# Patient Record
Sex: FEMALE | Race: WHITE | NOT HISPANIC OR LATINO | Employment: UNEMPLOYED | URBAN - METROPOLITAN AREA
[De-identification: names, ages, dates, MRNs, and addresses within clinical notes are randomized per-mention and may not be internally consistent; named-entity substitution may affect disease eponyms.]

---

## 2019-10-30 PROBLEM — G56.01 CARPAL TUNNEL SYNDROME, RIGHT UPPER LIMB: Status: ACTIVE | Noted: 2019-10-30

## 2020-01-08 PROBLEM — G56.02 CARPAL TUNNEL SYNDROME, LEFT UPPER LIMB: Status: ACTIVE | Noted: 2020-01-08

## 2021-02-28 ENCOUNTER — HOSPITAL ENCOUNTER (OUTPATIENT)
Facility: MEDICAL CENTER | Age: 41
End: 2021-03-01
Attending: EMERGENCY MEDICINE | Admitting: INTERNAL MEDICINE
Payer: MEDICAID

## 2021-02-28 ENCOUNTER — APPOINTMENT (OUTPATIENT)
Dept: RADIOLOGY | Facility: MEDICAL CENTER | Age: 41
End: 2021-02-28
Attending: EMERGENCY MEDICINE
Payer: MEDICAID

## 2021-02-28 ENCOUNTER — HOSPITAL ENCOUNTER (OUTPATIENT)
Dept: RADIOLOGY | Facility: MEDICAL CENTER | Age: 41
End: 2021-02-28
Payer: MEDICAID

## 2021-02-28 DIAGNOSIS — R10.30 LOWER ABDOMINAL PAIN: ICD-10-CM

## 2021-02-28 DIAGNOSIS — N73.9 PID (PELVIC INFLAMMATORY DISEASE): ICD-10-CM

## 2021-02-28 PROBLEM — J45.20 INTERMITTENT ASTHMA: Status: ACTIVE | Noted: 2021-02-28

## 2021-02-28 PROBLEM — I10 HYPERTENSION: Status: ACTIVE | Noted: 2021-02-28

## 2021-02-28 LAB
ALBUMIN SERPL BCP-MCNC: 3.3 G/DL (ref 3.2–4.9)
ALBUMIN/GLOB SERPL: 1.1 G/DL
ALP SERPL-CCNC: 74 U/L (ref 30–99)
ALT SERPL-CCNC: 76 U/L (ref 2–50)
AMORPH CRY #/AREA URNS HPF: PRESENT /HPF
ANION GAP SERPL CALC-SCNC: 8 MMOL/L (ref 7–16)
APPEARANCE UR: ABNORMAL
AST SERPL-CCNC: 37 U/L (ref 12–45)
BACTERIA #/AREA URNS HPF: NEGATIVE /HPF
BASOPHILS # BLD AUTO: 0.3 % (ref 0–1.8)
BASOPHILS # BLD: 0.02 K/UL (ref 0–0.12)
BILIRUB SERPL-MCNC: 0.4 MG/DL (ref 0.1–1.5)
BILIRUB UR QL STRIP.AUTO: NEGATIVE
BUN SERPL-MCNC: 10 MG/DL (ref 8–22)
C TRACH DNA SPEC QL NAA+PROBE: NEGATIVE
CALCIUM SERPL-MCNC: 8.4 MG/DL (ref 8.5–10.5)
CANDIDA DNA VAG QL PROBE+SIG AMP: NEGATIVE
CHLORIDE SERPL-SCNC: 104 MMOL/L (ref 96–112)
CO2 SERPL-SCNC: 21 MMOL/L (ref 20–33)
COLOR UR: YELLOW
CREAT SERPL-MCNC: 0.6 MG/DL (ref 0.5–1.4)
EOSINOPHIL # BLD AUTO: 0.13 K/UL (ref 0–0.51)
EOSINOPHIL NFR BLD: 1.6 % (ref 0–6.9)
EPI CELLS #/AREA URNS HPF: ABNORMAL /HPF
ERYTHROCYTE [DISTWIDTH] IN BLOOD BY AUTOMATED COUNT: 44.5 FL (ref 35.9–50)
G VAGINALIS DNA VAG QL PROBE+SIG AMP: POSITIVE
GLOBULIN SER CALC-MCNC: 2.9 G/DL (ref 1.9–3.5)
GLUCOSE SERPL-MCNC: 89 MG/DL (ref 65–99)
GLUCOSE UR STRIP.AUTO-MCNC: NEGATIVE MG/DL
HCT VFR BLD AUTO: 38.1 % (ref 37–47)
HGB BLD-MCNC: 12.5 G/DL (ref 12–16)
IMM GRANULOCYTES # BLD AUTO: 0.04 K/UL (ref 0–0.11)
IMM GRANULOCYTES NFR BLD AUTO: 0.5 % (ref 0–0.9)
KETONES UR STRIP.AUTO-MCNC: NEGATIVE MG/DL
LEUKOCYTE ESTERASE UR QL STRIP.AUTO: ABNORMAL
LYMPHOCYTES # BLD AUTO: 2.15 K/UL (ref 1–4.8)
LYMPHOCYTES NFR BLD: 27.1 % (ref 22–41)
MCH RBC QN AUTO: 29.6 PG (ref 27–33)
MCHC RBC AUTO-ENTMCNC: 32.8 G/DL (ref 33.6–35)
MCV RBC AUTO: 90.1 FL (ref 81.4–97.8)
MICRO URNS: ABNORMAL
MONOCYTES # BLD AUTO: 0.73 K/UL (ref 0–0.85)
MONOCYTES NFR BLD AUTO: 9.2 % (ref 0–13.4)
N GONORRHOEA DNA SPEC QL NAA+PROBE: NEGATIVE
NEUTROPHILS # BLD AUTO: 4.87 K/UL (ref 2–7.15)
NEUTROPHILS NFR BLD: 61.3 % (ref 44–72)
NITRITE UR QL STRIP.AUTO: NEGATIVE
NRBC # BLD AUTO: 0 K/UL
NRBC BLD-RTO: 0 /100 WBC
PH UR STRIP.AUTO: 5 [PH] (ref 5–8)
PLATELET # BLD AUTO: 188 K/UL (ref 164–446)
PMV BLD AUTO: 10 FL (ref 9–12.9)
POTASSIUM SERPL-SCNC: 3.9 MMOL/L (ref 3.6–5.5)
PROT SERPL-MCNC: 6.2 G/DL (ref 6–8.2)
PROT UR QL STRIP: NEGATIVE MG/DL
RBC # BLD AUTO: 4.23 M/UL (ref 4.2–5.4)
RBC # URNS HPF: ABNORMAL /HPF
RBC UR QL AUTO: ABNORMAL
SARS-COV-2 RNA RESP QL NAA+PROBE: NOTDETECTED
SODIUM SERPL-SCNC: 133 MMOL/L (ref 135–145)
SP GR UR STRIP.AUTO: >=1.045
SPECIMEN SOURCE: NORMAL
SPECIMEN SOURCE: NORMAL
T VAGINALIS DNA VAG QL PROBE+SIG AMP: NEGATIVE
UROBILINOGEN UR STRIP.AUTO-MCNC: 0.2 MG/DL
WBC # BLD AUTO: 7.9 K/UL (ref 4.8–10.8)
WBC #/AREA URNS HPF: ABNORMAL /HPF

## 2021-02-28 PROCEDURE — G0378 HOSPITAL OBSERVATION PER HR: HCPCS

## 2021-02-28 PROCEDURE — 700111 HCHG RX REV CODE 636 W/ 250 OVERRIDE (IP): Performed by: INTERNAL MEDICINE

## 2021-02-28 PROCEDURE — 76856 US EXAM PELVIC COMPLETE: CPT

## 2021-02-28 PROCEDURE — U0005 INFEC AGEN DETEC AMPLI PROBE: HCPCS

## 2021-02-28 PROCEDURE — 87660 TRICHOMONAS VAGIN DIR PROBE: CPT

## 2021-02-28 PROCEDURE — U0003 INFECTIOUS AGENT DETECTION BY NUCLEIC ACID (DNA OR RNA); SEVERE ACUTE RESPIRATORY SYNDROME CORONAVIRUS 2 (SARS-COV-2) (CORONAVIRUS DISEASE [COVID-19]), AMPLIFIED PROBE TECHNIQUE, MAKING USE OF HIGH THROUGHPUT TECHNOLOGIES AS DESCRIBED BY CMS-2020-01-R: HCPCS

## 2021-02-28 PROCEDURE — 85025 COMPLETE CBC W/AUTO DIFF WBC: CPT

## 2021-02-28 PROCEDURE — 81001 URINALYSIS AUTO W/SCOPE: CPT

## 2021-02-28 PROCEDURE — 700105 HCHG RX REV CODE 258: Performed by: EMERGENCY MEDICINE

## 2021-02-28 PROCEDURE — 87480 CANDIDA DNA DIR PROBE: CPT

## 2021-02-28 PROCEDURE — 99285 EMERGENCY DEPT VISIT HI MDM: CPT

## 2021-02-28 PROCEDURE — 96367 TX/PROPH/DG ADDL SEQ IV INF: CPT

## 2021-02-28 PROCEDURE — 700111 HCHG RX REV CODE 636 W/ 250 OVERRIDE (IP): Performed by: EMERGENCY MEDICINE

## 2021-02-28 PROCEDURE — 99220 PR INITIAL OBSERVATION CARE,LEVL III: CPT | Performed by: INTERNAL MEDICINE

## 2021-02-28 PROCEDURE — 96374 THER/PROPH/DIAG INJ IV PUSH: CPT

## 2021-02-28 PROCEDURE — A9270 NON-COVERED ITEM OR SERVICE: HCPCS | Performed by: INTERNAL MEDICINE

## 2021-02-28 PROCEDURE — 700102 HCHG RX REV CODE 250 W/ 637 OVERRIDE(OP): Performed by: INTERNAL MEDICINE

## 2021-02-28 PROCEDURE — 87491 CHLMYD TRACH DNA AMP PROBE: CPT

## 2021-02-28 PROCEDURE — 96376 TX/PRO/DX INJ SAME DRUG ADON: CPT

## 2021-02-28 PROCEDURE — 87591 N.GONORRHOEAE DNA AMP PROB: CPT

## 2021-02-28 PROCEDURE — 700101 HCHG RX REV CODE 250: Performed by: EMERGENCY MEDICINE

## 2021-02-28 PROCEDURE — 96365 THER/PROPH/DIAG IV INF INIT: CPT

## 2021-02-28 PROCEDURE — 96372 THER/PROPH/DIAG INJ SC/IM: CPT | Mod: XU

## 2021-02-28 PROCEDURE — 87510 GARDNER VAG DNA DIR PROBE: CPT

## 2021-02-28 PROCEDURE — 96375 TX/PRO/DX INJ NEW DRUG ADDON: CPT

## 2021-02-28 PROCEDURE — 80053 COMPREHEN METABOLIC PANEL: CPT

## 2021-02-28 RX ORDER — BISACODYL 10 MG
10 SUPPOSITORY, RECTAL RECTAL
Status: DISCONTINUED | OUTPATIENT
Start: 2021-02-28 | End: 2021-03-01 | Stop reason: HOSPADM

## 2021-02-28 RX ORDER — ACETAMINOPHEN 325 MG/1
650 TABLET ORAL EVERY 6 HOURS PRN
Status: DISCONTINUED | OUTPATIENT
Start: 2021-02-28 | End: 2021-03-01 | Stop reason: HOSPADM

## 2021-02-28 RX ORDER — OXYCODONE HYDROCHLORIDE 5 MG/1
2.5 TABLET ORAL
Status: DISCONTINUED | OUTPATIENT
Start: 2021-02-28 | End: 2021-03-01 | Stop reason: HOSPADM

## 2021-02-28 RX ORDER — PROMETHAZINE HYDROCHLORIDE 25 MG/1
12.5-25 TABLET ORAL EVERY 4 HOURS PRN
Status: DISCONTINUED | OUTPATIENT
Start: 2021-02-28 | End: 2021-03-01 | Stop reason: HOSPADM

## 2021-02-28 RX ORDER — ONDANSETRON 2 MG/ML
4 INJECTION INTRAMUSCULAR; INTRAVENOUS EVERY 4 HOURS PRN
Status: DISCONTINUED | OUTPATIENT
Start: 2021-02-28 | End: 2021-03-01 | Stop reason: HOSPADM

## 2021-02-28 RX ORDER — METRONIDAZOLE 500 MG/1
500 TABLET ORAL EVERY 8 HOURS
Status: DISCONTINUED | OUTPATIENT
Start: 2021-02-28 | End: 2021-03-01 | Stop reason: HOSPADM

## 2021-02-28 RX ORDER — OXYCODONE HYDROCHLORIDE 5 MG/1
5 TABLET ORAL
Status: DISCONTINUED | OUTPATIENT
Start: 2021-02-28 | End: 2021-03-01 | Stop reason: HOSPADM

## 2021-02-28 RX ORDER — PROCHLORPERAZINE EDISYLATE 5 MG/ML
5-10 INJECTION INTRAMUSCULAR; INTRAVENOUS EVERY 4 HOURS PRN
Status: DISCONTINUED | OUTPATIENT
Start: 2021-02-28 | End: 2021-03-01 | Stop reason: HOSPADM

## 2021-02-28 RX ORDER — DOXYCYCLINE 100 MG/1
100 TABLET ORAL EVERY 12 HOURS
Status: DISCONTINUED | OUTPATIENT
Start: 2021-02-28 | End: 2021-03-01 | Stop reason: HOSPADM

## 2021-02-28 RX ORDER — MORPHINE SULFATE 4 MG/ML
2 INJECTION, SOLUTION INTRAMUSCULAR; INTRAVENOUS
Status: DISCONTINUED | OUTPATIENT
Start: 2021-02-28 | End: 2021-03-01 | Stop reason: HOSPADM

## 2021-02-28 RX ORDER — MORPHINE SULFATE 4 MG/ML
4 INJECTION, SOLUTION INTRAMUSCULAR; INTRAVENOUS ONCE
Status: COMPLETED | OUTPATIENT
Start: 2021-02-28 | End: 2021-02-28

## 2021-02-28 RX ORDER — HYDROCHLOROTHIAZIDE 25 MG/1
25 TABLET ORAL EVERY MORNING
Status: DISCONTINUED | OUTPATIENT
Start: 2021-02-28 | End: 2021-03-01 | Stop reason: HOSPADM

## 2021-02-28 RX ORDER — KETOROLAC TROMETHAMINE 30 MG/ML
30 INJECTION, SOLUTION INTRAMUSCULAR; INTRAVENOUS EVERY 6 HOURS PRN
Status: DISCONTINUED | OUTPATIENT
Start: 2021-02-28 | End: 2021-03-01 | Stop reason: HOSPADM

## 2021-02-28 RX ORDER — LABETALOL HYDROCHLORIDE 5 MG/ML
10 INJECTION, SOLUTION INTRAVENOUS EVERY 4 HOURS PRN
Status: DISCONTINUED | OUTPATIENT
Start: 2021-02-28 | End: 2021-03-01 | Stop reason: HOSPADM

## 2021-02-28 RX ORDER — LISINOPRIL 10 MG/1
10 TABLET ORAL EVERY MORNING
Status: DISCONTINUED | OUTPATIENT
Start: 2021-02-28 | End: 2021-03-01 | Stop reason: HOSPADM

## 2021-02-28 RX ORDER — AMOXICILLIN 250 MG
2 CAPSULE ORAL 2 TIMES DAILY
Status: DISCONTINUED | OUTPATIENT
Start: 2021-02-28 | End: 2021-03-01 | Stop reason: HOSPADM

## 2021-02-28 RX ORDER — PROMETHAZINE HYDROCHLORIDE 25 MG/1
12.5-25 SUPPOSITORY RECTAL EVERY 4 HOURS PRN
Status: DISCONTINUED | OUTPATIENT
Start: 2021-02-28 | End: 2021-03-01 | Stop reason: HOSPADM

## 2021-02-28 RX ORDER — ONDANSETRON 4 MG/1
4 TABLET, ORALLY DISINTEGRATING ORAL EVERY 4 HOURS PRN
Status: DISCONTINUED | OUTPATIENT
Start: 2021-02-28 | End: 2021-03-01 | Stop reason: HOSPADM

## 2021-02-28 RX ORDER — MORPHINE SULFATE 4 MG/ML
4 INJECTION, SOLUTION INTRAMUSCULAR; INTRAVENOUS ONCE
Status: DISCONTINUED | OUTPATIENT
Start: 2021-02-28 | End: 2021-02-28

## 2021-02-28 RX ORDER — POLYETHYLENE GLYCOL 3350 17 G/17G
1 POWDER, FOR SOLUTION ORAL
Status: DISCONTINUED | OUTPATIENT
Start: 2021-02-28 | End: 2021-03-01 | Stop reason: HOSPADM

## 2021-02-28 RX ORDER — SODIUM CHLORIDE, SODIUM LACTATE, POTASSIUM CHLORIDE, CALCIUM CHLORIDE 600; 310; 30; 20 MG/100ML; MG/100ML; MG/100ML; MG/100ML
INJECTION, SOLUTION INTRAVENOUS CONTINUOUS
Status: DISCONTINUED | OUTPATIENT
Start: 2021-02-28 | End: 2021-02-28

## 2021-02-28 RX ORDER — CLONIDINE HYDROCHLORIDE 0.1 MG/1
0.1 TABLET ORAL EVERY 6 HOURS PRN
Status: DISCONTINUED | OUTPATIENT
Start: 2021-02-28 | End: 2021-03-01 | Stop reason: HOSPADM

## 2021-02-28 RX ORDER — ONDANSETRON 2 MG/ML
4 INJECTION INTRAMUSCULAR; INTRAVENOUS ONCE
Status: COMPLETED | OUTPATIENT
Start: 2021-02-28 | End: 2021-02-28

## 2021-02-28 RX ORDER — ONDANSETRON 2 MG/ML
4 INJECTION INTRAMUSCULAR; INTRAVENOUS ONCE
Status: DISCONTINUED | OUTPATIENT
Start: 2021-02-28 | End: 2021-02-28

## 2021-02-28 RX ADMIN — KETOROLAC TROMETHAMINE 30 MG: 30 INJECTION, SOLUTION INTRAMUSCULAR; INTRAVENOUS at 18:38

## 2021-02-28 RX ADMIN — HYDROCHLOROTHIAZIDE 25 MG: 25 TABLET ORAL at 11:07

## 2021-02-28 RX ADMIN — DOCUSATE SODIUM 50 MG AND SENNOSIDES 8.6 MG 2 TABLET: 8.6; 5 TABLET, FILM COATED ORAL at 18:38

## 2021-02-28 RX ADMIN — OXYCODONE 5 MG: 5 TABLET ORAL at 11:07

## 2021-02-28 RX ADMIN — DOXYCYCLINE 100 MG: 100 TABLET, FILM COATED ORAL at 18:37

## 2021-02-28 RX ADMIN — MORPHINE SULFATE 4 MG: 4 INJECTION INTRAVENOUS at 08:47

## 2021-02-28 RX ADMIN — ONDANSETRON 4 MG: 2 INJECTION INTRAMUSCULAR; INTRAVENOUS at 08:47

## 2021-02-28 RX ADMIN — OXYCODONE 5 MG: 5 TABLET ORAL at 16:49

## 2021-02-28 RX ADMIN — SODIUM CHLORIDE, POTASSIUM CHLORIDE, SODIUM LACTATE AND CALCIUM CHLORIDE: 600; 310; 30; 20 INJECTION, SOLUTION INTRAVENOUS at 07:10

## 2021-02-28 RX ADMIN — LISINOPRIL 10 MG: 10 TABLET ORAL at 11:06

## 2021-02-28 RX ADMIN — ENOXAPARIN SODIUM 40 MG: 40 INJECTION SUBCUTANEOUS at 12:43

## 2021-02-28 RX ADMIN — CEFTRIAXONE SODIUM 2 G: 2 INJECTION, POWDER, FOR SOLUTION INTRAMUSCULAR; INTRAVENOUS at 11:16

## 2021-02-28 RX ADMIN — DOXYCYCLINE 100 MG: 100 INJECTION, POWDER, LYOPHILIZED, FOR SOLUTION INTRAVENOUS at 11:17

## 2021-02-28 RX ADMIN — ACETAMINOPHEN 650 MG: 325 TABLET ORAL at 19:50

## 2021-02-28 RX ADMIN — KETOROLAC TROMETHAMINE 30 MG: 30 INJECTION, SOLUTION INTRAMUSCULAR; INTRAVENOUS at 12:43

## 2021-02-28 RX ADMIN — METRONIDAZOLE 500 MG: 500 TABLET ORAL at 22:35

## 2021-02-28 RX ADMIN — METRONIDAZOLE 500 MG: 500 INJECTION, SOLUTION INTRAVENOUS at 12:42

## 2021-02-28 ASSESSMENT — LIFESTYLE VARIABLES
HAVE YOU EVER FELT YOU SHOULD CUT DOWN ON YOUR DRINKING: YES
HAVE PEOPLE ANNOYED YOU BY CRITICIZING YOUR DRINKING: NO
HOW MANY TIMES IN THE PAST YEAR HAVE YOU HAD 5 OR MORE DRINKS IN A DAY: 0
ALCOHOL_USE: YES
EVER HAD A DRINK FIRST THING IN THE MORNING TO STEADY YOUR NERVES TO GET RID OF A HANGOVER: YES
AVERAGE NUMBER OF DAYS PER WEEK YOU HAVE A DRINK CONTAINING ALCOHOL: 5
EVER FELT BAD OR GUILTY ABOUT YOUR DRINKING: YES
HAVE PEOPLE ANNOYED YOU BY CRITICIZING YOUR DRINKING: NO
CONSUMPTION TOTAL: POSITIVE
CONSUMPTION TOTAL: POSITIVE
ON A TYPICAL DAY WHEN YOU DRINK ALCOHOL HOW MANY DRINKS DO YOU HAVE: 5
TOTAL SCORE: 0
TOTAL SCORE: 0
ON A TYPICAL DAY WHEN YOU DRINK ALCOHOL HOW MANY DRINKS DO YOU HAVE: 1
TOTAL SCORE: 3
TOTAL SCORE: 3
EVER HAD A DRINK FIRST THING IN THE MORNING TO STEADY YOUR NERVES TO GET RID OF A HANGOVER: NO
TOTAL SCORE: 3
DOES PATIENT WANT TO TALK TO SOMEONE ABOUT QUITTING: NO
HOW MANY TIMES IN THE PAST YEAR HAVE YOU HAD 5 OR MORE DRINKS IN A DAY: 20
DO YOU DRINK ALCOHOL: YES
TOTAL SCORE: 0
AVERAGE NUMBER OF DAYS PER WEEK YOU HAVE A DRINK CONTAINING ALCOHOL: 7
DOES PATIENT WANT TO STOP DRINKING: YES
HAVE YOU EVER FELT YOU SHOULD CUT DOWN ON YOUR DRINKING: NO
EVER FELT BAD OR GUILTY ABOUT YOUR DRINKING: NO
DOES PATIENT WANT TO STOP DRINKING: NO

## 2021-02-28 ASSESSMENT — FIBROSIS 4 INDEX
FIB4 SCORE: 0.9
FIB4 SCORE: 0.9

## 2021-02-28 ASSESSMENT — PATIENT HEALTH QUESTIONNAIRE - PHQ9
1. LITTLE INTEREST OR PLEASURE IN DOING THINGS: NOT AT ALL
SUM OF ALL RESPONSES TO PHQ9 QUESTIONS 1 AND 2: 0
2. FEELING DOWN, DEPRESSED, IRRITABLE, OR HOPELESS: NOT AT ALL
2. FEELING DOWN, DEPRESSED, IRRITABLE, OR HOPELESS: NOT AT ALL
SUM OF ALL RESPONSES TO PHQ9 QUESTIONS 1 AND 2: 0
1. LITTLE INTEREST OR PLEASURE IN DOING THINGS: NOT AT ALL

## 2021-02-28 ASSESSMENT — ENCOUNTER SYMPTOMS
NAUSEA: 0
BLOOD IN STOOL: 0
CHILLS: 0
DIARRHEA: 0
ABDOMINAL PAIN: 1
COUGH: 0
VOMITING: 0
FEVER: 0

## 2021-02-28 ASSESSMENT — PAIN DESCRIPTION - PAIN TYPE
TYPE: ACUTE PAIN

## 2021-02-28 ASSESSMENT — PAIN SCALES - WONG BAKER: WONGBAKER_NUMERICALRESPONSE: HURTS EVEN MORE

## 2021-02-28 NOTE — ED TRIAGE NOTES
"Chief Complaint   Patient presents with   • Sent by MD     Patient bib care flight from [facility] as a transfer for possible acute appendicitis. Patient c/o of abdominal pain x 2 days. Possible appendicitis and ovarian abscess from CT. Patient treated with 200 mcg of fentanyl and 4 mg zofran in flight. 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 /86   Pulse 65   Temp 36.1 °C (97 °F) (Temporal)   Resp 14   Ht 1.499 m (4' 11\")   Wt 77.1 kg (170 lb)   LMP 02/21/2021   SpO2 98%   BMI 34.34 kg/m²     "

## 2021-02-28 NOTE — CONSULTS
Obstetrics & Gynecology Consultation    Date of Service  2/28/2021    Referring Physician  Eliot uGerra M.D.    Consulting Physician  Tony Belle D.O.    Reason for Consultation  Suspected TOA, pelvic pain    History of Presenting Illness  40 y.o. female who presented 2/28/2021 to the ER with pelvic pain for the last day.  It is worse on the left and is sharp.  Movement makes the pain worse.  She has had PID in the past.  Denies any vaginal discharge.  Denies nausea, vomiting, diarrhea.  Denies fevers.     She initially went to [facility], where CT showed left adnexa demonstrating the cystic structure likely hydrosalpinx/pyosalpinx versus less likely tubo-ovarian abscess.  Patient was then transferred to [facility] ED.  A pelvic exam was performed in the ED and she was found to have CMT.  This was done by the ED physician.  An u/s was ordered.  It showed the left cystic structure c/w TOA vs. Hydrosalpinx.  There was no measurements on the u/s report, but the images showed a 4.51 x 5.64 cm structure.  Her WBCs were normal.  She was admitted by Dr. Guerra and started on triple antibiotics.  GC/CT cultures were done in the ED.    Review of Systems  ROS    Past Medical History   has a past medical history of Asthma, Hypertension, Insomnia, and Pain.    Surgical History   has a past surgical history that includes dilation and curettage (10/98); irrigation & debridement ortho (4/2/08); pr revise median n/carpal tunnel surg (Right, 10/30/2019); ankle arthroscopy (4/2/08); synovectomy (4/2/08); ankle ligament reconstruction (7/17/08); carpal tunnel release (Right); and pr revise median n/carpal tunnel surg (Left, 1/8/2020).    Family History  family history includes Diabetes in an other family member; Hypertension in an other family member.    Social History   reports that she has been smoking cigarettes. She has been smoking about 0.10 packs per day. She has never used smokeless tobacco. She reports current  alcohol use. She reports current drug use. Drug: Intravenous.    Medications  Prior to Admission Medications   Prescriptions Last Dose Informant Patient Reported? Taking?   hydroCHLOROthiazide (HYDRODIURIL) 25 MG Tab 2/27/2021 at AM Patient Yes No   Sig: Take 25 mg by mouth every morning.   lisinopril (PRINIVIL) 10 MG Tab 2/27/2021 at AM Patient Yes No   Sig: Take 10 mg by mouth every morning.      Facility-Administered Medications: None       Allergies  Allergies   Allergen Reactions   • Food      Coconut causes rash   • Nkda [No Known Drug Allergy]        Physical Exam  Temp:  [36.1 °C (97 °F)-36.8 °C (98.3 °F)] 36.8 °C (98.3 °F)  Pulse:  [] 110  Resp:  [14-20] 20  BP: (130-156)/() 149/102  SpO2:  [95 %-99 %] 95 %    Physical Exam   Gen: mild distress due to pain, A+Ox3  CV: S1/S2, RRR  Lungs: CTAB  Abd: soft, TTP diffuse, worse on LLQ. Neg rebound/guarding.    Pelvic: deferred, performed by ED.    Fluids      Laboratory  Recent Labs     02/28/21  0831   WBC 7.9   RBC 4.23   HEMOGLOBIN 12.5   HEMATOCRIT 38.1   MCV 90.1   MCH 29.6   MCHC 32.8*   RDW 44.5   PLATELETCT 188   MPV 10.0     Recent Labs     02/28/21  0831   SODIUM 133*   POTASSIUM 3.9   CHLORIDE 104   CO2 21   GLUCOSE 89   BUN 10   CREATININE 0.60   CALCIUM 8.4*                     Imaging  US-PELVIC COMPLETE (TRANSABDOMINAL/TRANSVAGINAL) (COMBO)   Final Result      Tubular structure in the left adnexa adjacent to the left ovary likely represents a hydrosalpinx but pyosalpinx is not excluded.      Nonvisualization of the right ovary.      No free fluid in the pelvis.      Nabothian cysts.      OUTSIDE IMAGES-CT ABDOMEN /PELVIS   Final Result          Assessment/Plan  1. Left hydrosalpinx vs. TOA - agree with antibiotic regimen as previously ordered.  Surgical intervention for the adnexal mass is not warranted at this time.  Recommended IR evaluation for possible drainage if not improving.  Await vaginal cultures.

## 2021-02-28 NOTE — ASSESSMENT & PLAN NOTE
-With cervical wall motion tenderness on pelvic exam in the ED.  She does not appear septic.  She is sexually active with 2 partners (both male and female). CT and pelvic ultrasound showed tubular structure in the left adnexa adjacent to the left ovary which is likely hydrosalpinx/pyosalpinx.  Doubt TOA given normal WBC count, and absence of sepsis.  -Start IV ceftriaxone, IV Flagyl, and IV doxycycline.  Follow GC test.  Follow blood cultures.  For completion, will send for RPR and HIV.  -Pain control with IV Toradol, IV morphine, and oral oxycodone.  -ERP has contacted surgery and gynecology to review the images to make sure there is no further surgical interventions needed.  -Trend WBC count, watch for fevers.

## 2021-02-28 NOTE — H&P
Hospital Medicine History & Physical Note    Date of Service  2/28/2021    Primary Care Physician  Pcp Pt States None    Consultants  Surgery, Gynecology    Code Status  Full Code    Chief Complaint  Chief Complaint   Patient presents with   • Sent by MD       History of Presenting Illness  40 y.o. female with only asthma and essential hypertension, currently sexually active with 2 partners (both male and female), who presented 2/28/2021 with 1 day of lower abdomen and pelvic pain which is worsened in the past several hours.  She described the pain as sharp and burning, worse with movement and walking, without any radiation.  She rated it at 9 out of 10 in severity.  She denied any other complaint such as bowel movement changes, nausea, vomiting, or vaginal discharge/bleeding.  She had no chest pain, shortness of breath.  She had no fevers or chills and otherwise was doing well until onset of pain 1 day ago.    She initially went to [facility], where CT showed left adnexa demonstrating the cystic structure likely hydrosalpinx/pyosalpinx versus less likely tubo-ovarian abscess.  Patient was then transferred to [facility] ED.    ED course:  The patient was initially evaluated.  Vital signs were stable.  She was afebrile.  Initial blood work-up showed no leukocytosis, with normal electrolytes and renal function, and urinalysis showing 10-20 WBC with small leukocyte esterase positive nitrite and only 2-5 RBC.  Pelvic ultrasound was obtained which showed the same tubular structure in the left adnexa adjacent to the left ovary, with no free fluid in the pelvis.  Pelvic exam was obtained which showed normal external genitalia, with copious yellow discharge, and cervical wall motion tenderness.  GC test was sent.  ER physician is contacting surgery and gynecology to review the images.  Patient was subsequent admitted to the hospitalist service.    Review of Systems  ROS     Pertinent positives/negatives as mentioned  above.     A complete review of systems was personally done by me. All other systems were negative.       Past Medical History   has a past medical history of Asthma, Hypertension, Insomnia, and Pain.    Surgical History   has a past surgical history that includes dilation and curettage (10/98); irrigation & debridement ortho (4/2/08); pr revise median n/carpal tunnel surg (Right, 10/30/2019); ankle arthroscopy (4/2/08); synovectomy (4/2/08); ankle ligament reconstruction (7/17/08); carpal tunnel release (Right); and pr revise median n/carpal tunnel surg (Left, 1/8/2020).     Family History  family history includes Diabetes in an other family member; Hypertension in an other family member.     Social History   reports that she has been smoking cigarettes. She has been smoking about 0.10 packs per day. She has never used smokeless tobacco. She reports current alcohol use. She reports current drug use. Drug: Intravenous.    Allergies  Allergies   Allergen Reactions   • Food      Coconut causes rash   • Nkda [No Known Drug Allergy]        Medications  Prior to Admission Medications   Prescriptions Last Dose Informant Patient Reported? Taking?   hydroCHLOROthiazide (HYDRODIURIL) 25 MG Tab 2/27/2021 at AM Patient Yes No   Sig: Take 25 mg by mouth every morning.   lisinopril (PRINIVIL) 10 MG Tab 2/27/2021 at AM Patient Yes No   Sig: Take 10 mg by mouth every morning.      Facility-Administered Medications: None       Physical Exam  Temp:  [36.1 °C (97 °F)-36.8 °C (98.3 °F)] 36.8 °C (98.3 °F)  Pulse:  [] 110  Resp:  [14-20] 20  BP: (130-156)/() 149/102  SpO2:  [95 %-99 %] 95 %    Physical Exam  Vitals reviewed.   Constitutional:       General: She is not in acute distress.     Appearance: Normal appearance. She is normal weight. She is not ill-appearing or diaphoretic.   HENT:      Head: Normocephalic and atraumatic.      Right Ear: External ear normal.      Left Ear: External ear normal.      Mouth/Throat:       Mouth: Mucous membranes are moist.      Pharynx: No oropharyngeal exudate or posterior oropharyngeal erythema.   Eyes:      General: No scleral icterus.     Extraocular Movements: Extraocular movements intact.      Conjunctiva/sclera: Conjunctivae normal.      Pupils: Pupils are equal, round, and reactive to light.   Cardiovascular:      Rate and Rhythm: Normal rate and regular rhythm.      Heart sounds: Normal heart sounds. No murmur.   Pulmonary:      Effort: Pulmonary effort is normal. No respiratory distress.      Breath sounds: Normal breath sounds. No stridor. No wheezing, rhonchi or rales.   Chest:      Chest wall: No tenderness.   Abdominal:      General: Bowel sounds are normal. There is no distension.      Palpations: Abdomen is soft. There is no mass.      Tenderness: There is abdominal tenderness ( suprapubic area). There is no guarding or rebound.   Musculoskeletal:         General: No swelling. Normal range of motion.      Cervical back: Normal range of motion and neck supple. No rigidity. No muscular tenderness.      Right lower leg: No edema.      Left lower leg: No edema.   Lymphadenopathy:      Cervical: No cervical adenopathy.   Skin:     General: Skin is warm and dry.      Coloration: Skin is not jaundiced.      Findings: No rash.   Neurological:      General: No focal deficit present.      Mental Status: She is alert and oriented to person, place, and time. Mental status is at baseline.      Cranial Nerves: No cranial nerve deficit.   Psychiatric:         Mood and Affect: Mood normal.         Behavior: Behavior normal.         Thought Content: Thought content normal.         Judgment: Judgment normal.         Laboratory:  Recent Labs     02/28/21  0831   WBC 7.9   RBC 4.23   HEMOGLOBIN 12.5   HEMATOCRIT 38.1   MCV 90.1   MCH 29.6   MCHC 32.8*   RDW 44.5   PLATELETCT 188   MPV 10.0     Recent Labs     02/28/21  0831   SODIUM 133*   POTASSIUM 3.9   CHLORIDE 104   CO2 21   GLUCOSE 89   BUN 10    CREATININE 0.60   CALCIUM 8.4*     Recent Labs     02/28/21  0831   ALTSGPT 76*   ASTSGOT 37   ALKPHOSPHAT 74   TBILIRUBIN 0.4   GLUCOSE 89         No results for input(s): NTPROBNP in the last 72 hours.      No results for input(s): TROPONINT in the last 72 hours.    Imaging:  US-PELVIC COMPLETE (TRANSABDOMINAL/TRANSVAGINAL) (COMBO)   Final Result      Tubular structure in the left adnexa adjacent to the left ovary likely represents a hydrosalpinx but pyosalpinx is not excluded.      Nonvisualization of the right ovary.      No free fluid in the pelvis.      Nabothian cysts.      OUTSIDE IMAGES-CT ABDOMEN /PELVIS   Final Result            Assessment/Plan:  I anticipate this patient is appropriate for observation status at this time.    * PID (pelvic inflammatory disease)- (present on admission)  Assessment & Plan  -With cervical wall motion tenderness on pelvic exam in the ED.  She does not appear septic.  She is sexually active with 2 partners (both male and female). CT and pelvic ultrasound showed tubular structure in the left adnexa adjacent to the left ovary which is likely hydrosalpinx/pyosalpinx.  Doubt TOA given normal WBC count, and absence of sepsis.  -Start IV ceftriaxone, IV Flagyl, and IV doxycycline.  Follow GC test.  Follow blood cultures.  For completion, will send for RPR and HIV.  -Pain control with IV Toradol, IV morphine, and oral oxycodone.  -ERP has contacted surgery and gynecology to review the images to make sure there is no further surgical interventions needed.  -Trend WBC count, watch for fevers.    Hypertension- (present on admission)  Assessment & Plan  -Resume home hydrochlorothiazide, and lisinopril.  Monitor blood pressure trend closely with as needed oral clonidine and labetalol for significant hypertension.    Intermittent asthma- (present on admission)  Assessment & Plan  -Not in acute exacerbation.  She will be on RT protocol while in-house.      DVT prophylaxis: Lovenox SQ

## 2021-02-28 NOTE — ASSESSMENT & PLAN NOTE
-Resume home hydrochlorothiazide, and lisinopril.  Monitor blood pressure trend closely with as needed oral clonidine and labetalol for significant hypertension.

## 2021-02-28 NOTE — CARE PLAN
Problem: Venous Thromboembolism (VTW)/Deep Vein Thrombosis (DVT) Prevention:  Goal: Patient will participate in Venous Thrombosis (VTE)/Deep Vein Thrombosis (DVT) Prevention Measures  Outcome: PROGRESSING AS EXPECTED     Problem: Safety  Goal: Will remain free from injury  Outcome: PROGRESSING AS EXPECTED  Goal: Will remain free from falls  Outcome: PROGRESSING AS EXPECTED

## 2021-02-28 NOTE — ED PROVIDER NOTES
ED Provider Note    Scribed for Oliver Cotton M.D. by Sylvie Marie. 2/28/2021, 6:43 AM.    Primary care provider: Pcp Pt States None  Means of arrival: EMS   History obtained from: Patient   History limited by: None    CHIEF COMPLAINT  Chief Complaint   Patient presents with   • Sent by MD     PPE Note: I personally donned full PPE for all patient encounters during this visit, including wearing an N95 respirator mask. Scribe remained outside the patient's room and did not have any contact with the patient for the duration of patient encounter.      HPI  Medina Horvath is a 40 y.o. female who presents to the Emergency Department as a transfer patient from [facility] for gradually worsening abdominal pain onset 2 days ago. Her pain is exacerbated when moving or walking. She noticed hematochezia 2 days ago. She states she has not had a bowel movement since. She denies nausea, vomiting, fever, chills, diarrhea, cough, and rhinorrhea.     Her transfer information shows the following:   Labs show she normal CBC, CMP shows ALT of 108, AST of 45, and Ca of 8.1 otherwise normal. HCG is negative. UA is normal ACT is negative. Lipase was normal.   CT was abnormal: Left adnexa demonstrates a cystic structure likely a hydrosalpinx/pyosalpinx versus less likely tuboovarian abscess. Advise ultrasound evaluation if symptoms are relatable to this region. Equivocal findings of acute appendicitis. Mildly dilated appendix without definitive wall thickening. Advise correlation with clinical suspicion. Consider surgical consultation. The sigmoid colon demonstrates minimally circumferentially thickened walls. Colonic finding is likely due to under distention of the colon vs. Less likely an underlying mild colitis.    Sent her in for US and possible surgical intervention.     REVIEW OF SYSTEMS  Review of Systems   Constitutional: Negative for chills and fever.   HENT: Negative for congestion.    Respiratory: Negative for cough.   "  Gastrointestinal: Positive for abdominal pain. Negative for blood in stool, diarrhea, nausea and vomiting.   All other systems reviewed and are negative.      PAST MEDICAL HISTORY   has a past medical history of Asthma, Hypertension, Insomnia, and Pain.    SURGICAL HISTORY   has a past surgical history that includes dilation and curettage (10/98); irrigation & debridement ortho (08); revise median n/carpal tunnel surg (Right, 10/30/2019); ankle arthroscopy (08); synovectomy (08); ankle ligament reconstruction (08); carpal tunnel release (Right); and revise median n/carpal tunnel surg (Left, 2020).    SOCIAL HISTORY  Social History     Tobacco Use   • Smoking status: Current Every Day Smoker     Packs/day: 0.10     Types: Cigarettes     Last attempt to quit: 2019     Years since quittin.6   • Smokeless tobacco: Never Used   Substance Use Topics   • Alcohol use: Yes     Comment: 2 drinks per week   • Drug use: Yes     Types: Intravenous     Comment: current meth user      Social History     Substance and Sexual Activity   Drug Use Yes   • Types: Intravenous    Comment: current meth user       FAMILY HISTORY  Family History   Problem Relation Age of Onset   • Diabetes Other    • Hypertension Other        CURRENT MEDICATIONS  Home Medications     Reviewed by Joseph Nelson R.N. (Registered Nurse) on 21 at 1046  Med List Status: Complete   Medication Last Dose Status   hydroCHLOROthiazide (HYDRODIURIL) 25 MG Tab 2021 Active   lisinopril (PRINIVIL) 10 MG Tab 2021 Active                ALLERGIES  Allergies   Allergen Reactions   • Food      Coconut causes rash   • Nkda [No Known Drug Allergy]        PHYSICAL EXAM  VITAL SIGNS: /86   Pulse 65   Temp 36.1 °C (97 °F) (Temporal)   Resp 14   Ht 1.499 m (4' 11\")   Wt 77.1 kg (170 lb)   LMP 2021   SpO2 98%   BMI 34.34 kg/m²     Constitutional:  Mild distress, somnolent  HENT: slightly dry mucous " membranes  Eyes:  No conjunctivitis or icterus  Neck:  trachea is midline, no palpable thyroid  Lymphatic:  No cervical lymphadenopathy  Cardiovascular:  Regular rate and rhythm, no murmurs  Thorax & Lungs:  Normal breath sounds, no rhonchi  Abdomen:  Soft, diffuse abdominal tenderness of the bilateral lower quadrant. No peritoneal signs.   Skin:. no rash  Back:  Non-tender, no CVA tenderness  Extremities:   no edema  Vascular:  symmetric radial pulse  Neurologic:  Normal gross motor  Pelvic: normal female genitalia. Yellowish copious discharge. Cervical motion tenderness range of motion.    LABS  Labs Reviewed   CBC WITH DIFFERENTIAL - Abnormal; Notable for the following components:       Result Value    MCHC 32.8 (*)     All other components within normal limits   COMP METABOLIC PANEL - Abnormal; Notable for the following components:    Sodium 133 (*)     Calcium 8.4 (*)     ALT(SGPT) 76 (*)     All other components within normal limits   URINALYSIS - Abnormal; Notable for the following components:    Character Turbid (*)     Specific Gravity >=1.045 (*)     Leukocyte Esterase Small (*)     Occult Blood Small (*)     All other components within normal limits   URINE MICROSCOPIC (W/UA) - Abnormal; Notable for the following components:    WBC 10-20 (*)     RBC 2-5 (*)     Epithelial Cells Many (*)     All other components within normal limits   ESTIMATED GFR   SARS-COV-2, PCR (IN-HOUSE)    Narrative:     Have you been in close contact with a person who is suspected  or known to be positive for COVID-19 within the last 30 days  (e.g. last seen that person < 30 days ago)->Unknown   CHLAMYDIA/GC PCR URINE OR SWAB   VAGINAL PATHOGENS DNA PANEL   MAGNESIUM   HIV AG/AB COMBO ASSAY SCREENING   T.PALLIDUM AB EIA     All labs reviewed by me.    RADIOLOGY  US-PELVIC COMPLETE (TRANSABDOMINAL/TRANSVAGINAL) (COMBO)   Final Result      Tubular structure in the left adnexa adjacent to the left ovary likely represents a  hydrosalpinx but pyosalpinx is not excluded.      Nonvisualization of the right ovary.      No free fluid in the pelvis.      Nabothian cysts.      OUTSIDE IMAGES-CT ABDOMEN /PELVIS   Final Result        The radiologist's interpretation of all radiological studies have been reviewed by me.    COURSE & MEDICAL DECISION MAKING  Pertinent Labs & Imaging studies reviewed. (See chart for details)    6:43 AM - Patient seen and examined at bedside. Patient will be treated with LR infusion. Ordered US-Pelvic Complete, CBC with diff, and CMP to evaluate her symptoms. The differential diagnoses include but are not limited to: tuboovarian abscess vs. appendicitis     HYDRATION: Based on the patient's presentation of Dehydration the patient was given IV fluids. IV Hydration was used because oral hydration was not adequate alone. Upon recheck following hydration, the patient was improved. Patient was able to urinate.     8:31 AM - Ordered Estimated GFR to further evaluate.     8:43 AM - Treated patient with morphine 4 mg and Zofran 4 mg.    9:24 AM - Ordered Urinalysis to further evaluate.     9:35 AM - Performed Pelvic Exam: normal female genitalia. Yellowish copious discharge. Cervical motion tenderness range of motion. Suspicious for PID.     9:44 AM - Ordered COVID Test in preparation for hospitalization.     9:51 AM - Treated patient with Rocephin 2g, Vibramycin 100 mg, andn Flagyl 500 mg    9:56 AM - Paged Hospitalist Dr. Guerra for CDU Admission.     10:01 AM - I discussed the patient's case and the above findings with Dr. Guerra (Hospitlaist) who recommends I have gynecology assess.     10:05 AM - Ordered Chlamydia and GC test to further evaluate.     Medical Decision Making:   Patient presented with abdominal pain a CT that had a questionable appendix abnormality in fluid collection in the salpinx.  I did do a ultrasound which confirmed a hydrosalpinx and less likely tubo-ovarian abscess.  Some labs were repeated they were  unchanged.  As patient has normal CBC she has bilateral tenderness left greater than right on her exam.  Pelvic exam 0 significant abnormal discharge that was sent for evaluation.  She also had cervical motion tenderness.  She was started on ceftriaxone Flagyl and doxycycline.  I did contact the hospitalist for admission Dr. Simpson of GYN is aware of the patient as well.  Dr. Snowden did evaluate the CT scan.  Clinically she does not present as appendicitis so general surgery official consult is not indicated at this time.    DISPOSITION:  Patient will be hospitalized by Dr. Guerra in guarded condition.    FINAL IMPRESSION  1. Lower abdominal pain    2. PID (pelvic inflammatory disease)          Sylvie WILKS (Scribe), am scribing for, and in the presence of, Oliver Cotton M.D..    Electronically signed by: Sylvie Marie (Sae), 2/28/2021    Oliver WILKS M.D. personally performed the services described in this documentation, as scribed by Sylvie Marie in my presence, and it is both accurate and complete.    The note accurately reflects work and decisions made by me.  Oliver Cotton M.D.  2/28/2021  12:33 PM

## 2021-02-28 NOTE — PROGRESS NOTES
Pt arrived to unit via wheel chair at 1100. Pt oriented to room, unit, and plan of care. Tele-monitor placed and monitor room notified. All questions answered at this time. Call light within reach; fall precautions in place.

## 2021-03-01 ENCOUNTER — PHARMACY VISIT (OUTPATIENT)
Dept: PHARMACY | Facility: MEDICAL CENTER | Age: 41
End: 2021-03-01
Payer: COMMERCIAL

## 2021-03-01 VITALS
WEIGHT: 187.39 LBS | SYSTOLIC BLOOD PRESSURE: 119 MMHG | HEART RATE: 66 BPM | RESPIRATION RATE: 20 BRPM | DIASTOLIC BLOOD PRESSURE: 79 MMHG | TEMPERATURE: 97.8 F | HEIGHT: 59 IN | BODY MASS INDEX: 37.78 KG/M2 | OXYGEN SATURATION: 95 %

## 2021-03-01 LAB
ANION GAP SERPL CALC-SCNC: 12 MMOL/L (ref 7–16)
BASOPHILS # BLD AUTO: 0.2 % (ref 0–1.8)
BASOPHILS # BLD: 0.02 K/UL (ref 0–0.12)
BUN SERPL-MCNC: 10 MG/DL (ref 8–22)
CALCIUM SERPL-MCNC: 8.9 MG/DL (ref 8.5–10.5)
CHLORIDE SERPL-SCNC: 103 MMOL/L (ref 96–112)
CO2 SERPL-SCNC: 21 MMOL/L (ref 20–33)
CREAT SERPL-MCNC: 0.65 MG/DL (ref 0.5–1.4)
EOSINOPHIL # BLD AUTO: 0.12 K/UL (ref 0–0.51)
EOSINOPHIL NFR BLD: 1.5 % (ref 0–6.9)
ERYTHROCYTE [DISTWIDTH] IN BLOOD BY AUTOMATED COUNT: 44.7 FL (ref 35.9–50)
GLUCOSE SERPL-MCNC: 83 MG/DL (ref 65–99)
HCT VFR BLD AUTO: 40.7 % (ref 37–47)
HGB BLD-MCNC: 12.9 G/DL (ref 12–16)
HIV 1+2 AB+HIV1 P24 AG SERPL QL IA: NORMAL
IMM GRANULOCYTES # BLD AUTO: 0.04 K/UL (ref 0–0.11)
IMM GRANULOCYTES NFR BLD AUTO: 0.5 % (ref 0–0.9)
LYMPHOCYTES # BLD AUTO: 1.55 K/UL (ref 1–4.8)
LYMPHOCYTES NFR BLD: 19.2 % (ref 22–41)
MAGNESIUM SERPL-MCNC: 1.8 MG/DL (ref 1.5–2.5)
MCH RBC QN AUTO: 28.5 PG (ref 27–33)
MCHC RBC AUTO-ENTMCNC: 31.7 G/DL (ref 33.6–35)
MCV RBC AUTO: 90 FL (ref 81.4–97.8)
MONOCYTES # BLD AUTO: 0.8 K/UL (ref 0–0.85)
MONOCYTES NFR BLD AUTO: 9.9 % (ref 0–13.4)
NEUTROPHILS # BLD AUTO: 5.56 K/UL (ref 2–7.15)
NEUTROPHILS NFR BLD: 68.7 % (ref 44–72)
NRBC # BLD AUTO: 0 K/UL
NRBC BLD-RTO: 0 /100 WBC
PLATELET # BLD AUTO: 244 K/UL (ref 164–446)
PMV BLD AUTO: 10.3 FL (ref 9–12.9)
POTASSIUM SERPL-SCNC: 4.2 MMOL/L (ref 3.6–5.5)
RBC # BLD AUTO: 4.52 M/UL (ref 4.2–5.4)
SODIUM SERPL-SCNC: 136 MMOL/L (ref 135–145)
TREPONEMA PALLIDUM IGG+IGM AB [PRESENCE] IN SERUM OR PLASMA BY IMMUNOASSAY: NORMAL
WBC # BLD AUTO: 8.1 K/UL (ref 4.8–10.8)

## 2021-03-01 PROCEDURE — 96376 TX/PRO/DX INJ SAME DRUG ADON: CPT | Mod: XU

## 2021-03-01 PROCEDURE — 85025 COMPLETE CBC W/AUTO DIFF WBC: CPT

## 2021-03-01 PROCEDURE — A9270 NON-COVERED ITEM OR SERVICE: HCPCS | Performed by: INTERNAL MEDICINE

## 2021-03-01 PROCEDURE — 700102 HCHG RX REV CODE 250 W/ 637 OVERRIDE(OP): Performed by: INTERNAL MEDICINE

## 2021-03-01 PROCEDURE — 700111 HCHG RX REV CODE 636 W/ 250 OVERRIDE (IP): Performed by: INTERNAL MEDICINE

## 2021-03-01 PROCEDURE — 80048 BASIC METABOLIC PNL TOTAL CA: CPT

## 2021-03-01 PROCEDURE — RXMED WILLOW AMBULATORY MEDICATION CHARGE: Performed by: NURSE PRACTITIONER

## 2021-03-01 PROCEDURE — G0378 HOSPITAL OBSERVATION PER HR: HCPCS

## 2021-03-01 PROCEDURE — 700105 HCHG RX REV CODE 258: Performed by: INTERNAL MEDICINE

## 2021-03-01 PROCEDURE — 99217 PR OBSERVATION CARE DISCHARGE: CPT | Performed by: INTERNAL MEDICINE

## 2021-03-01 PROCEDURE — 86780 TREPONEMA PALLIDUM: CPT

## 2021-03-01 PROCEDURE — 87389 HIV-1 AG W/HIV-1&-2 AB AG IA: CPT

## 2021-03-01 PROCEDURE — 83735 ASSAY OF MAGNESIUM: CPT

## 2021-03-01 RX ORDER — DOXYCYCLINE 100 MG/1
100 TABLET ORAL EVERY 12 HOURS
Qty: 26 TABLET | Refills: 0 | Status: SHIPPED | OUTPATIENT
Start: 2021-03-01 | End: 2021-03-14

## 2021-03-01 RX ORDER — METRONIDAZOLE 500 MG/1
500 TABLET ORAL EVERY 12 HOURS
Qty: 26 TABLET | Refills: 0 | Status: SHIPPED | OUTPATIENT
Start: 2021-03-01 | End: 2021-03-14

## 2021-03-01 RX ORDER — OXYCODONE HYDROCHLORIDE 5 MG/1
5 TABLET ORAL EVERY 6 HOURS PRN
Qty: 12 TABLET | Refills: 0 | Status: SHIPPED | OUTPATIENT
Start: 2021-03-01 | End: 2021-03-04

## 2021-03-01 RX ADMIN — LISINOPRIL 10 MG: 10 TABLET ORAL at 06:18

## 2021-03-01 RX ADMIN — OXYCODONE 5 MG: 5 TABLET ORAL at 08:13

## 2021-03-01 RX ADMIN — CEFTRIAXONE SODIUM 1 G: 1 INJECTION, POWDER, FOR SOLUTION INTRAMUSCULAR; INTRAVENOUS at 06:24

## 2021-03-01 RX ADMIN — DOXYCYCLINE 100 MG: 100 TABLET, FILM COATED ORAL at 06:18

## 2021-03-01 RX ADMIN — HYDROCHLOROTHIAZIDE 25 MG: 25 TABLET ORAL at 06:18

## 2021-03-01 RX ADMIN — OXYCODONE 5 MG: 5 TABLET ORAL at 02:31

## 2021-03-01 RX ADMIN — METRONIDAZOLE 500 MG: 500 TABLET ORAL at 06:18

## 2021-03-01 RX ADMIN — KETOROLAC TROMETHAMINE 30 MG: 30 INJECTION, SOLUTION INTRAMUSCULAR; INTRAVENOUS at 02:37

## 2021-03-01 ASSESSMENT — PAIN DESCRIPTION - PAIN TYPE
TYPE: CHRONIC PAIN
TYPE: ACUTE PAIN
TYPE: ACUTE PAIN

## 2021-03-01 ASSESSMENT — PATIENT HEALTH QUESTIONNAIRE - PHQ9
SUM OF ALL RESPONSES TO PHQ9 QUESTIONS 1 AND 2: 0
2. FEELING DOWN, DEPRESSED, IRRITABLE, OR HOPELESS: NOT AT ALL
1. LITTLE INTEREST OR PLEASURE IN DOING THINGS: NOT AT ALL

## 2021-03-01 NOTE — CARE PLAN
Problem: Venous Thromboembolism (VTW)/Deep Vein Thrombosis (DVT) Prevention:  Goal: Patient will participate in Venous Thrombosis (VTE)/Deep Vein Thrombosis (DVT) Prevention Measures  Outcome: PROGRESSING AS EXPECTED     Problem: Safety  Goal: Will remain free from injury  Outcome: PROGRESSING AS EXPECTED  Goal: Will remain free from falls  Outcome: PROGRESSING AS EXPECTED     Problem: Pain Management  Goal: Pain level will decrease to patient's comfort goal  Outcome: PROGRESSING AS EXPECTED

## 2021-03-01 NOTE — DISCHARGE SUMMARY
Discharge Summary    CHIEF COMPLAINT ON ADMISSION  Chief Complaint   Patient presents with   • Sent by MD       Reason for Admission  EMS     Admission Date  2/28/2021    CODE STATUS  Full Code    HPI & HOSPITAL COURSE  This is a 40 y.o. female here with lower abdominal pain.CT showed left hydrosalpinx. GYN was consulted and recommended no further intervention from their standpoint and to continue antibiotics. IR was consulted for possible drainage, but said it was too small to drain. Her labs did not reveal gonorrhea or chlamydia, but was + for BV. Patient reported pain improved with following morning and was tolerating PO intake. For these reasons, she was discharge with 14 day course of Flagyl and Doxycycline and instructions to f/u with her GYN. Abdominal was soft and BS were normoactive. Patient denied fever, chills or diaphoresis . She remained afebrile and without leukocytosis.     Therefore, she is discharged in fair and stable condition to home with close outpatient follow-up.    The patient recovered much more quickly than anticipated on admission.    Discharge Date  3/1/2021    FOLLOW UP ITEMS POST DISCHARGE  GYN    DISCHARGE DIAGNOSES  Principal Problem:    PID (pelvic inflammatory disease) POA: Yes  Active Problems:    Intermittent asthma POA: Yes    Hypertension POA: Yes  Resolved Problems:    * No resolved hospital problems. *      FOLLOW UP  No future appointments.  No follow-up provider specified.    MEDICATIONS ON DISCHARGE     Medication List      START taking these medications      Instructions   doxycycline monohydrate 100 MG tablet  Commonly known as: ADOXA   Take 1 tablet by mouth every 12 hours for 13 days.  Dose: 100 mg     metroNIDAZOLE 500 MG Tabs  Commonly known as: FLAGYL   Take 1 tablet by mouth every 12 hours for 13 days.  Dose: 500 mg     oxyCODONE immediate-release 5 MG Tabs  Commonly known as: ROXICODONE   Take 1 tablet by mouth every 6 hours as needed for Severe Pain for up to 3  days.  Dose: 5 mg        CONTINUE taking these medications      Instructions   hydroCHLOROthiazide 25 MG Tabs  Commonly known as: HYDRODIURIL   Take 25 mg by mouth every morning.  Dose: 25 mg     lisinopril 10 MG Tabs  Commonly known as: PRINIVIL   Take 10 mg by mouth every morning.  Dose: 10 mg            Allergies  Allergies   Allergen Reactions   • Food      Coconut causes rash   • Nkda [No Known Drug Allergy]        DIET  Orders Placed This Encounter   Procedures   • Diet NPO     Standing Status:   Standing     Number of Occurrences:   1     Order Specific Question:   Restrict to:     Answer:   Strict [1]       ACTIVITY  As tolerated.  Weight bearing as tolerated    CONSULTATIONS  Gynecologist, Dr. Belle     PROCEDURES  N/A    LABORATORY  Lab Results   Component Value Date    SODIUM 136 03/01/2021    POTASSIUM 4.2 03/01/2021    CHLORIDE 103 03/01/2021    CO2 21 03/01/2021    GLUCOSE 83 03/01/2021    BUN 10 03/01/2021    CREATININE 0.65 03/01/2021    CREATININE 1.0 03/28/2008        Lab Results   Component Value Date    WBC 8.1 03/01/2021    HEMOGLOBIN 12.9 03/01/2021    HEMATOCRIT 40.7 03/01/2021    PLATELETCT 244 03/01/2021        Total time of the discharge process exceeds 35 minutes.

## 2021-03-01 NOTE — DISCHARGE INSTRUCTIONS
Discharge instructions:    DIET: Resume home diet previous to admission    ACTIVITY: No sexual intercourse. For at least 14 days or until cleared by your gynecologist.    DIAGNOSIS: Pelvic Inflammatory Disease. Bacterial Vaginosis.     Return to ER if fever greater than 38 degree Celsius or 100.4 degree Fahrenheit, worsening pain, chest pain at rest or associated with exertion, worsening shortness of breath, bloody coughs, bloody bowel movement, severe unrelenting abdominal pain, focal weakness.    CEDRIC Coughlin       Pelvic Inflammatory Disease    Pelvic inflammatory disease (PID) is an infection in some or all of the female reproductive organs. PID can be in the womb (uterus), ovaries, fallopian tubes, or nearby tissues that are inside the lower belly area (pelvis). PID can lead to problems if it is not treated.  What are the causes?  · Germs (bacteria) that are spread during sex. This is the most common cause.  · Germs in the vagina that are not spread during sex.  · Germs that travel up from the vagina or cervix to the reproductive organs after:  ? The birth of a baby.  ? A miscarriage.  ? An .  ? Pelvic surgery.  ? Insertion of an intrauterine device (IUD).  ? A sexual assault.  What increases the risk?  · Being younger than 25 years old.  · Having sex at a young age.  · Having a history of STI (sexually transmitted infection) or PID.  · Not using barrier birth control, such as condoms.  · Having a lot of sex partners.  · Having sex with someone who has symptoms of an STI.  · Using a douche.  · Having an IUD put in place.  What are the signs or symptoms?  · Pain in the belly area.  · Fever.  · Chills.  · Discharge from the vagina that is not normal.  · Bleeding from the womb that is not normal.  · Pain soon after the end of a menstrual period.  · Pain when you pee (urinate).  · Pain with sex.  · Feeling sick to your stomach (nauseous) or throwing up (vomiting).  How is this  treated?  · Antibiotic medicines. In very bad cases, these may be given through an IV tube.  · Surgery. This is rare.  · Efforts to stop the spread of the infection. Sex partners may need to be treated.  It may take weeks until you feel all better. Your doctor may test you for infection again after you finish treatment. You should also be checked for HIV (human immunodeficiency virus).  Follow these instructions at home:  · Take over-the-counter and prescription medicines only as told by your doctor.  · If you were prescribed an antibiotic medicine, take it as told by your doctor. Do not stop taking it even if you start to feel better.  · Do not have sex until treatment is done or as told by your doctor.  · Tell your sex partner if you have PID. Your partner may need to be treated.  · Keep all follow-up visits as told by your doctor. This is important.  Contact a doctor if:  · You have more fluid or fluid that is not normal coming from your vagina.  · Your pain does not improve.  · You throw up.  · You have a fever.  · You cannot take your medicines.  · Your partner has an STI.  · You have pain when you pee.  Get help right away if:  · You have more pain in the belly area.  · You have chills.  · You are not better in 72 hours with treatment.  Summary  · Pelvic inflammatory disease (PID) is caused by an infection in some or all of the female reproductive organs.  · PID is a serious infection.  · This infection is most often treated with antibiotics.  · Do not have sex until treatment is done or as told by your doctor.  This information is not intended to replace advice given to you by your health care provider. Make sure you discuss any questions you have with your health care provider.  Document Released: 03/16/2010 Document Revised: 09/05/2019 Document Reviewed: 09/11/2019  [employer] Patient Education © 2020 [employer]

## 2021-03-01 NOTE — DISCHARGE PLANNING
Meds-to-Beds: Discharge prescription orders listed below delivered to patient's bedside. RONAL Lennon notified. Patient counseled.       Medina Horvath   Home Medication Instructions ABIGAIL:[id number]    Printed on:03/01/21 1342   Medication Information                      doxycycline monohydrate (ADOXA) 100 MG tablet  Take 1 tablet by mouth every 12 hours for 13 days.             metroNIDAZOLE (FLAGYL) 500 MG Tab  Take 1 tablet by mouth every 12 hours for 13 days.                 Michaela Santiago, PharmD

## 2021-03-01 NOTE — PROGRESS NOTES
Assessment done, Pt educated on plan of care.   Patient resting in bed, no signs of distress,   complaints of pain at this time meds given per dr order . Call light within reach,  side rails up, will monitor. Condition stable PM cares done

## 2021-03-02 ENCOUNTER — PATIENT OUTREACH (OUTPATIENT)
Dept: HEALTH INFORMATION MANAGEMENT | Facility: OTHER | Age: 41
End: 2021-03-02

## 2021-03-02 NOTE — LETTER
March 3, 2021        Medina [facility]  [address]  [place] [address]        Dear Medina:    Welcome to [facility] Care Management! Your team of Registered Nurses, Social Workers and Pharmacists are partnered with your [facility] Providers to assist you with accessing resources and education to support your individual needs. As you work with your Care Management Team, you will be empowered to be successful with learning how to self-manage your health with the Patient Centered goals of helping you to feel better and staying out of the hospital. This program is at no cost to you and is a part of [facility]’s ongoing commitment to serve the people of our community.  Benefits of working with your Care Management Team includes:  - Comprehensive assessment by a Registered Nurse on the telephone to identify your medical and health needs.   - Telephonic review of your medications by a Care Management Pharmacist to answer any questions you may have about your medications.  - Evaluation of social needs, such as, transportation; financial; food; housing; etc. by a Care Management  to connect you with eligible resources.  - For those eligible, we will connect you with the [facility], offering access to services to assist you to manage your Congestive Heart Failure or Chronic Obstructive Pulmonary Disease in your own home.    Please contact us at [phone] to schedule an introductory call with your Registered Nurse. We are available 5 days a week, Monday through Friday from 8:00 a.m. - 5:00 p.m.    We look forward to speaking with you soon.        Sincerely,

## 2021-03-09 NOTE — PROGRESS NOTES
CHW made 3x attempts to follow up w/ Medina post discharge. No return calls. Medina will be d/c from CCM services due to loss of contact.